# Patient Record
Sex: FEMALE | Employment: FULL TIME | URBAN - METROPOLITAN AREA
[De-identification: names, ages, dates, MRNs, and addresses within clinical notes are randomized per-mention and may not be internally consistent; named-entity substitution may affect disease eponyms.]

---

## 2017-05-11 NOTE — TELEPHONE ENCOUNTER
From: Christel Bermudez  To: Desmond Escalante MD  Sent: 5/8/2017 11:04 AM EDT  Subject: Medication Renewal Request    Original authorizing provider: MD Christel Mejía would like a refill of the following medications:  clobetasol (TEMOVATE) 0.05 % topical cream Desmond Escalante MD]    Preferred pharmacy: [facility] #[id number] - [address]:  Hello, is it possible to get a refill for the clobetasol cream? My next appointment is not til May 23 and my skin if flaring up really bad. Spring is the worst! It would be such a relief. Thank you so much.

## 2017-05-12 RX ORDER — CLOBETASOL PROPIONATE 0.5 MG/G
CREAM TOPICAL 2 TIMES DAILY
Qty: 45 G | Refills: 11 | Status: SHIPPED | OUTPATIENT
Start: 2017-05-12

## 2017-05-23 ENCOUNTER — OFFICE VISIT (OUTPATIENT)
Dept: INTERNAL MEDICINE CLINIC | Age: 32
End: 2017-05-23

## 2017-05-23 ENCOUNTER — HOSPITAL ENCOUNTER (OUTPATIENT)
Dept: LAB | Age: 32
Discharge: HOME | End: 2017-05-23
Payer: COMMERCIAL

## 2017-05-23 ENCOUNTER — HOSPITAL ENCOUNTER (OUTPATIENT)
Dept: LAB | Age: 32
Discharge: HOME | End: 2017-05-23

## 2017-05-23 VITALS
RESPIRATION RATE: 20 BRPM | WEIGHT: 119 LBS | HEART RATE: 81 BPM | TEMPERATURE: 97.7 F | OXYGEN SATURATION: 100 % | SYSTOLIC BLOOD PRESSURE: 144 MMHG | DIASTOLIC BLOOD PRESSURE: 85 MMHG | HEIGHT: 59 IN | BODY MASS INDEX: 23.99 KG/M2

## 2017-05-23 DIAGNOSIS — Z01.419 WELL WOMAN EXAM WITH ROUTINE GYNECOLOGICAL EXAM: Primary | ICD-10-CM

## 2017-05-23 DIAGNOSIS — I10 ESSENTIAL HYPERTENSION, BENIGN: ICD-10-CM

## 2017-05-23 PROCEDURE — 99001 SPECIMEN HANDLING PT-LAB: CPT | Performed by: FAMILY MEDICINE

## 2017-05-23 PROCEDURE — 88175 CYTOPATH C/V AUTO FLUID REDO: CPT | Performed by: FAMILY MEDICINE

## 2017-05-23 RX ORDER — METHYLDOPA 250 MG/1
250 TABLET, FILM COATED ORAL 3 TIMES DAILY
Qty: 90 TAB | Refills: 11 | Status: SHIPPED | OUTPATIENT
Start: 2017-05-23

## 2017-05-23 NOTE — PROGRESS NOTES
Subjective:   28 y.o. female for Well Woman Check. No LMP recorded. Compliant w/ meds. No CP, SOB, or edema. Trying to get pregnant. Social History: single partner, contraception - none. ROS:  Feeling well. No dyspnea or chest pain on exertion. No abdominal pain, change in bowel habits, black or bloody stools. No urinary tract symptoms. GYN ROS: normal menses, no abnormal bleeding, pelvic pain or discharge, no breast pain or new or enlarging lumps on self exam. No neurological complaints. Objective:     Visit Vitals    /85 (BP 1 Location: Right arm, BP Patient Position: Sitting)    Pulse 81    Temp 97.7 °F (36.5 °C) (Oral)    Resp 20    Ht 4' 11\" (1.499 m)    Wt 119 lb (54 kg)    SpO2 100%    BMI 24.04 kg/m2     The patient appears well, alert, oriented x 3, in no distress. ENT normal.  Neck supple. No adenopathy or thyromegaly. SALLY. Lungs are clear, good air entry, no wheezes, rhonchi or rales. S1 and S2 normal, no murmurs, regular rate and rhythm. Abdomen soft without tenderness, guarding, mass or organomegaly. Extremities show no edema, normal peripheral pulses. Neurological is normal, no focal findings. BREAST EXAM: breasts appear normal, no suspicious masses, no skin or nipple changes or axillary nodes    PELVIC EXAM: normal external genitalia, vulva, vagina, cervix, uterus and adnexa    Assessment/Plan:   well woman  pap smear  HTN- pt is working on getting pregnant. Will change toprol to aldomet 250mg tid. F/u in 1m for BP check      ICD-10-CM ICD-9-CM    1. Well woman exam with routine gynecological exam Z01.419 V72.31 PAP (IMAGE GUIDED), LIQUID-BASED      METABOLIC PANEL, COMPREHENSIVE      LIPID PANEL      NUSWAB VAGINITIS    [V72.31]   2. Essential hypertension, benign I10 401.1    .

## 2017-05-23 NOTE — PROGRESS NOTES
1. Have you been to the ER, urgent care clinic since your last visit? Hospitalized since your last visit? No.    2. Have you seen or consulted any other health care providers outside of the [address] [place] [address] since your last visit? Include any pap smears or colon screening.  No.

## 2017-05-23 NOTE — MR AVS SNAPSHOT
Visit Information Date & Time Provider Department Dept. Phone Encounter #  
 5/23/2017  2:30 PM Jerson Zhu, [id number] [id number] Follow-up Instructions Return in about 4 weeks (around 6/20/2017) for HTN. Upcoming Health Maintenance Date Due DTaP/Tdap/Td series (1 - Tdap) 1/7/2006 PAP AKA CERVICAL CYTOLOGY 4/15/2017 INFLUENZA AGE 9 TO ADULT 8/1/2017 Allergies as of 5/23/2017  Review Complete On: 5/23/2017 By: Mario Mccarty LPN Severity Noted Reaction Type Reactions Naproxen  04/18/2016    Hives, Rash, Itching Current Immunizations  Never Reviewed No immunizations on file. Not reviewed this visit You Were Diagnosed With   
  
 Codes Comments Well woman exam with routine gynecological exam    -  Primary ICD-10-CM: I02.976 ICD-9-CM: V72.31 [V72.31] Essential hypertension, benign     ICD-10-CM: I10 
ICD-9-CM: 401.1 Vitals BP Pulse Temp Resp Height(growth percentile) Weight(growth percentile) 144/85 (BP 1 Location: Right arm, BP Patient Position: Sitting) 81 97.7 °F (36.5 °C) (Oral) 20 4' 11\" (1.499 m) 119 lb (54 kg) SpO2 BMI OB Status Smoking Status 100% 24.04 kg/m2 Having regular periods Never Smoker Vitals History BMI and BSA Data Body Mass Index Body Surface Area 24.04 kg/m 2 1.5 m 2 Preferred Pharmacy Pharmacy Name Phone [id number] [address], [address] [facility] [phone] Your Updated Medication List  
  
   
This list is accurate as of: 5/23/17  4:09 PM.  Always use your most recent med list.  
  
  
  
  
 cetirizine 10 mg tablet Commonly known as:  ZYRTEC Take  by mouth daily. * clobetasol 0.05 % external solution Commonly known as:  Iam Talley Apply bid prn rash * clobetasol 0.05 % topical cream  
Commonly known as:  Iam Postin Apply  to affected area two (2) times a day. fexofenadine 180 mg tablet Commonly known as:  Jess Kristopher Take  by mouth daily. hydrOXYzine HCl 50 mg tablet Commonly known as:  ATARAX Take 1 Tab by mouth every six (6) hours as needed for Itching (Hives). methyldopa 250 mg tablet Commonly known as:  ALDOMET Take 1 Tab by mouth three (3) times daily. Indications: hypertension MULTIVITAMIN PO Take  by mouth daily. 1 TAB. norethindrone-ethinyl estradiol 1 mg-20 mcg (21)/75 mg (7) Tab Commonly known as:  Josue Reyes FE 1/20 (28) Take 1 Tab by mouth daily. pimecrolimus 1 % topical cream  
Commonly known as:  ELIDEL Apply  to affected area two (2) times a day. * Notice: This list has 2 medication(s) that are the same as other medications prescribed for you. Read the directions carefully, and ask your doctor or other care provider to review them with you. Prescriptions Sent to Pharmacy Refills  
 methyldopa (ALDOMET) 250 mg tablet 11 Sig: Take 1 Tab by mouth three (3) times daily. Indications: hypertension Class: Normal  
 [facility]acy[facility] [address] [place] [address], [address] [place] [address] #: [phone] Route: Oral  
  
We Performed the Following LIPID PANEL [89046 CPT(R)] METABOLIC PANEL, COMPREHENSIVE [47147 CPT(R)]   
 PAP (IMAGE GUIDED), LIQUID-BASED [HLC0129 Custom] Follow-up Instructions Return in about 4 weeks (around 6/20/2017) for HTN. Introducing [facility]! Dear Travon Cristobal: Thank you for requesting a [employer] account. Our records indicate that you already have an active [employer] account. You can access your account anytime at [email or web address] Did you know that you can access your hospital and ER discharge instructions at any time in [employer]? You can also review all of your test results from your hospital stay or ER visit. Additional Information If you have questions, please visit the Frequently Asked Questions section of the [email or web address] website at [email or web address] Remember, [employer] is NOT to be used for urgent needs. For medical emergencies, dial 911. Now available from your iPhone and Android! Please provide this summary of care documentation to your next provider. Your primary care clinician is listed as [address]. If you have any questions after today's visit, please call [phone].

## 2017-05-24 LAB
ALBUMIN SERPL-MCNC: 5.2 G/DL (ref 3.5–5.5)
ALBUMIN/GLOB SERPL: 1.9 {RATIO} (ref 1.2–2.2)
ALP SERPL-CCNC: 74 IU/L (ref 39–117)
ALT SERPL-CCNC: 22 IU/L (ref 0–32)
AST SERPL-CCNC: 24 IU/L (ref 0–40)
BILIRUB SERPL-MCNC: 0.4 MG/DL (ref 0–1.2)
BUN SERPL-MCNC: 10 MG/DL (ref 6–20)
BUN/CREAT SERPL: 19 (ref 9–23)
CALCIUM SERPL-MCNC: 9.9 MG/DL (ref 8.7–10.2)
CHLORIDE SERPL-SCNC: 97 MMOL/L (ref 96–106)
CHOLEST SERPL-MCNC: 173 MG/DL (ref 100–199)
CO2 SERPL-SCNC: 26 MMOL/L (ref 18–29)
CREAT SERPL-MCNC: 0.53 MG/DL (ref 0.57–1)
GLOBULIN SER CALC-MCNC: 2.8 G/DL (ref 1.5–4.5)
GLUCOSE SERPL-MCNC: 78 MG/DL (ref 65–99)
HDLC SERPL-MCNC: 85 MG/DL
INTERPRETATION, 910389: NORMAL
LDLC SERPL CALC-MCNC: 72 MG/DL (ref 0–99)
POTASSIUM SERPL-SCNC: 4.3 MMOL/L (ref 3.5–5.2)
PROT SERPL-MCNC: 8 G/DL (ref 6–8.5)
SODIUM SERPL-SCNC: 139 MMOL/L (ref 134–144)
TRIGL SERPL-MCNC: 82 MG/DL (ref 0–149)
VLDLC SERPL CALC-MCNC: 16 MG/DL (ref 5–40)

## 2017-05-25 LAB
A VAGINAE DNA VAG QL NAA+PROBE: NORMAL SCORE
BVAB2 DNA VAG QL NAA+PROBE: NORMAL SCORE
C ALBICANS DNA VAG QL NAA+PROBE: NEGATIVE
C GLABRATA DNA VAG QL NAA+PROBE: NEGATIVE
MEGA1 DNA VAG QL NAA+PROBE: NORMAL SCORE
T VAGINALIS RRNA SPEC QL NAA+PROBE: NEGATIVE